# Patient Record
Sex: MALE | Race: WHITE | NOT HISPANIC OR LATINO | Employment: FULL TIME | URBAN - METROPOLITAN AREA
[De-identification: names, ages, dates, MRNs, and addresses within clinical notes are randomized per-mention and may not be internally consistent; named-entity substitution may affect disease eponyms.]

---

## 2017-06-07 ENCOUNTER — ALLSCRIPTS OFFICE VISIT (OUTPATIENT)
Dept: OTHER | Facility: OTHER | Age: 32
End: 2017-06-07

## 2018-01-09 NOTE — RESULT NOTES
Message   normal     Verified Results  (1) TESTOSTERONE, FREE (DIRECT) AND TOTAL [id number] 03:47PM Louise Riddle Order Number: [id number]     Test Name Result Flag Reference   FREE TESTOSTERONE, DIRECT 13 0 pg/mL  9 3 - 26 5   COMMENT Comment     Adult male reference interval is based on a population of lean males  up to 36years old     TESTOSTERONE (TOTAL) 491 ng/dL  348 - 1197   Performed at:  [address]  [id number]  : Tian Vizcaino MD, Phone:  [phone]

## 2018-01-10 NOTE — PROGRESS NOTES
Assessment    1  Influenza (487 1) (J11 1)   2  Body aches (780 96) (R52)    Plan  Body aches    · Rapid Flu A and B- POC; Source:Nose; Status:Complete;   Done: 64BIQ3445 01:35PM  Influenza    · Tamiflu 75 MG Oral Capsule; Take 1 twice daily    Discussion/Summary    Pt is a 30 yo M  1  Influenza - Rapid Flu today pos  Start supportive care  icnrease fluids and rest  May take OTC Dayquil/nyquil  Rx given for Tamiflu to take BID  Pt educated on the importance of having Flu vaccine  The patient was counseled regarding instructions for management, patient and family education, importance of compliance with treatment  Chief Complaint    1  Cold Symptoms   2  Cough  Pt presents with c/o cough, congestion, fever, body aches, and chills for the past 2 days  History of Present Illness  Cold Symptoms:   LORNA MASSEY presents with complaints of gradual onset of mild cold symptoms starting 2 days ago  His symptoms are probably caused by work contact with URI  Symptoms are not improved by OTC cold medications  Associated symptoms include nasal congestion, sore throat, productive cough, plugged ear(s), ear pain, shortness of breath, fever and chills, but no sneezing  Review of Systems    Constitutional: no fever, not feeling poorly, no chills and not feeling tired  ENT: no sore throat and no nasal discharge  Cardiovascular: no chest pain and no palpitations  Respiratory: no cough and no shortness of breath during exertion  Gastrointestinal: no abdominal pain, no nausea and no diarrhea  Genitourinary: no dysuria and no incontinence  Musculoskeletal: no arthralgias and no myalgias  Integumentary: no rashes  Active Problems    1  Binge eating disorder (307 50) (F50 8)   2  Depression (311) (F32 9)   3  Growth hormone deficiency (253 3) (E23 0)   4  Increased appetite (783 6) (R63 2)   5  Polyuria (788 42) (R35 8)   6  Testicular hypogonadism (257 2) (E29 1)    Past Medical History    1  History of Abnormal weight gain (783 1) (R63 5)   2  History of Acute upper respiratory infection (465 9) (J06 9)   3  Excessive thirst (783 5) (R63 1)   4  History of acute bronchitis (V12 69) (Z87 09)   5  History of acute sinusitis (V12 69) (Z87 09)   6  History of fatigue (V13 89) (Z87 898)   7  History of urinary frequency (V13 09) (Z87 898)   8  History of Need for immunization against influenza (V04 81) (Z23)  Active Problems And Past Medical History Reviewed: The active problems and past medical history were reviewed and updated today  Family History    1  Family history of cardiac disorder (V17 49) (Z82 49)   2  Family history of Hypertension (V17 49)    3  Family history of cardiac disorder (V17 49) (Z82 49)   4  Family history of Hypertension (V17 49)    5  Family history of Diabetes Mellitus (V18 0)  Family History Reviewed: The family history was reviewed and updated today  Social History    · Current Smoker (305 1)   · Denied: History of Drug Use   · Occasional alcohol use  The social history was reviewed and updated today  The social history was reviewed and is unchanged  Surgical History    1  History of Dental Surgery  Surgical History Reviewed: The surgical history was reviewed and updated today  Current Meds   1  Axiron 30 MG/ACT Transdermal Solution; Apply 3 pumps daily; Therapy: 41HRD4084 to ([place])  Requested for: 0490 39 07 81; Last   Rx:29Oct2015 Ordered   2  Vyvanse 40 MG Oral Capsule; TAKE 1 CAPSULE Daily; Therapy: 48SRS5290 to (Evaluate:52Hrw2609); Last Rx:07Jan2016 Ordered    The medication list was reviewed and updated today  Allergies    1   No Known Drug Allergies    Vitals   Recorded: 25LFX7557 01:28PM   Temperature 101 4 F, Tympanic   Heart Rate 86   Pulse Quality Norm   Systolic 651, LUE, Sitting   Diastolic 76, LUE, Sitting   Height 5 ft 10 in   Weight 197 lb    BMI Calculated 28 27   BSA Calculated 2 08   O2 Saturation 97     Physical Exam    Constitutional   General appearance: No acute distress, well appearing and well nourished  Eyes   Conjunctiva and lids: No swelling, erythema, or discharge  Pupils and irises: Equal, round and reactive to light  Ears, Nose, Mouth, and Throat   External inspection of ears and nose: Normal     Nasal mucosa, septum, and turbinates: Normal without edema or erythema  Oropharynx: Normal with no erythema, edema, exudate or lesions  Pulmonary   Respiratory effort: No increased work of breathing or signs of respiratory distress  Auscultation of lungs: Clear to auscultation, equal breath sounds bilaterally, no wheezes, no rales, no rhonci  Cardiovascular   Auscultation of heart: Normal rate and rhythm, normal S1 and S2, without murmurs  Examination of extremities for edema and/or varicosities: Normal     Abdomen   Abdomen: Non-tender, no masses  Liver and spleen: No hepatomegaly or splenomegaly  Results/Data  Rapid Flu A and B- POC 33KDD7002 01:35PM Lazarus Em     Test Name Result Flag Reference   Rapid Flu A Positive     Rapid Flu B Negative         Future Appointments    Date/Time Provider Specialty Site   11/03/2016 09:30 AM HUY Monzon  Endocrinology [facility]   02/26/2016 01:00 PM Leonor Hawk DO Family Medicine [address]     Signatures   Electronically signed by :  Yenni Cordova DO; Jan 22 2016  1:56PM EST                       (Author)

## 2018-01-12 NOTE — RESULT NOTES
Verified Results  [facility]) Pathology Report [id number] 07:27AM Ross Jean     Test Name Result Flag Reference     Comment     Material submitted:                                          SKIN BIOPSY, RIGHT INNER THIGH     Comment     Clinician provided ICD-10:  L98 9     Comment     Clinical history:                                            CHANGING SKIN LESION     Comment     **********************************************************************  Diagnosis:  SKIN BIOPSY, RIGHT INNER THIGH:  VERRUCA VULGARIS   AX/09/26/2016  **********************************************************************     Comment     Electronically signed:                                       Krishna Fitzgerald MD, Pathologist     Comment     Gross description:                                            1 Container, formalin-filled, labeled with patient identification  SKIN BIOPSY, RIGHT INNER THIGH:  Received labeled R INNER THIGH is 1 piece(s) of white skin measuring  0 7 x 0 7 x 0 1 cm  The specimen is inked  A white raised lesion  measuring 0 5 x 0 5 cm is present  The specimen(s) is serially  sectioned and entirely submitted in cassette(s) 1   /PRD  /PRD     Comment     Pathologist provided ICD-10:  B07 8     Comment     CPT                                                            [id number]

## 2018-01-13 VITALS
OXYGEN SATURATION: 97 % | SYSTOLIC BLOOD PRESSURE: 118 MMHG | BODY MASS INDEX: 26.92 KG/M2 | TEMPERATURE: 97.7 F | HEART RATE: 68 BPM | DIASTOLIC BLOOD PRESSURE: 70 MMHG | WEIGHT: 188 LBS | HEIGHT: 70 IN | RESPIRATION RATE: 16 BRPM

## 2018-01-16 NOTE — MISCELLANEOUS
Provider Comments  Provider Comments:   no shiw      Signatures   Electronically signed by : HUY Mir ; Nov  3 2016 11:38AM EST

## 2018-01-16 NOTE — MISCELLANEOUS
Message  Return to work or school:   Hever Meehan is under my professional care  He was seen in my office on 01/22/2016       OUT OF WORK ON 01/23/2016 AND 01/24/2016  DR Corey Bar DO        Signatures   Electronically signed by : Emperatriz Sandoval, ; Jan 22 2016  1:49PM EST                       (Author)

## 2018-02-17 ENCOUNTER — OFFICE VISIT (OUTPATIENT)
Dept: FAMILY MEDICINE CLINIC | Facility: CLINIC | Age: 33
End: 2018-02-17
Payer: COMMERCIAL

## 2018-02-17 VITALS
RESPIRATION RATE: 19 BRPM | WEIGHT: 192.19 LBS | BODY MASS INDEX: 27.52 KG/M2 | OXYGEN SATURATION: 97 % | TEMPERATURE: 97.6 F | HEIGHT: 70 IN | HEART RATE: 62 BPM | SYSTOLIC BLOOD PRESSURE: 126 MMHG | DIASTOLIC BLOOD PRESSURE: 88 MMHG

## 2018-02-17 DIAGNOSIS — E29.1 TESTICULAR HYPOGONADISM: Primary | ICD-10-CM

## 2018-02-17 DIAGNOSIS — E23.0 GROWTH HORMONE DEFICIENCY (HCC): ICD-10-CM

## 2018-02-17 PROCEDURE — 99213 OFFICE O/P EST LOW 20 MIN: CPT | Performed by: PHYSICIAN ASSISTANT

## 2018-02-17 RX ORDER — TESTOSTERONE 30 MG/1.5ML
SOLUTION TOPICAL
COMMUNITY
Start: 2013-11-26 | End: 2018-02-17 | Stop reason: SDUPTHER

## 2018-02-17 RX ORDER — TESTOSTERONE 30 MG/1.5ML
SOLUTION TOPICAL
Qty: 90 ML | Refills: 5 | Status: SHIPPED | OUTPATIENT
Start: 2018-02-17 | End: 2018-05-29 | Stop reason: SDUPTHER

## 2018-02-17 NOTE — PROGRESS NOTES
Assessment/Plan:    Growth hormone deficiency ([address] )  Was seeing endocrine - according to OV note from 2016 did not want tx  Stable  Testicular hypogonadism  Was seeing endo, now following with us for continued management  Testosterone refilled today  Also given script for FBW done - CBC, BMP, PSA, testosterone - will call him with results  Binge eating disorder  Stable, pt does not want to go back on vyvanse  Continue with therapist  Will call if problems  No purging  Diagnoses and all orders for this visit:    Testicular hypogonadism  -     Testosterone 30 MG/ACT SOLN; Apply 3 pumps to skin/axilla as directed daily  -     CBC and differential; Future  -     Basic metabolic panel; Future  -     Testosterone, free, total; Future  -     PSA, total and free; Future    Growth hormone deficiency (HCC)  -     Testosterone 30 MG/ACT SOLN; Apply 3 pumps to skin/axilla as directed daily  -     CBC and differential; Future  -     Basic metabolic panel; Future  -     Testosterone, free, total; Future  -     PSA, total and free; Future      F/U in 6 months  Chief Complaint   Patient presents with    Medication Check     pt states he stopped taking Vyvanse since 7/2017 due to insurance change     Subjective:      Patient ID: Allen Ramos is a 29 y o  male  31y/o male here today for med check for his testosterone  Was seeing endocrine for testicular hypogonadism and growth hormone deficiency  Was being tx with testosterone topical and has been doing well  Feels well overall and has no complaints  He still has binge eating problems at times, no purging  Controlled by going to gym, seeing a therapist  Yudith conklin/hugo wasn't helping anymore  Doesn't want to try new medication          The following portions of the patient's history were reviewed and updated as appropriate: allergies, current medications, past family history, past medical history, past social history, past surgical history and problem list     Review of Systems   Constitutional: Negative  Respiratory: Negative  Cardiovascular: Negative  Gastrointestinal: Negative  Endocrine: Negative  Genitourinary: Negative  Psychiatric/Behavioral:        As in HPI         Objective:      /88 (BP Location: Left arm, Patient Position: Sitting, Cuff Size: Adult)   Pulse 62   Temp 97 6 °F (36 4 °C) (Tympanic)   Resp 19   Ht 5' 10" (1 778 m)   Wt 87 2 kg (192 lb 3 oz)   SpO2 97%   BMI 27 58 kg/m²          Physical Exam   Constitutional: He is oriented to person, place, and time  He appears well-developed and well-nourished  Neck: Neck supple  Cardiovascular: Normal rate, regular rhythm and normal heart sounds  Pulmonary/Chest: Effort normal and breath sounds normal    Abdominal: Soft  Bowel sounds are normal    Neurological: He is alert and oriented to person, place, and time  Psychiatric: He has a normal mood and affect       Rosevelt Elmer

## 2018-02-17 NOTE — ASSESSMENT & PLAN NOTE
Was seeing philip, now following with us for continued management  Testosterone refilled today  Also given script for FBW done - CBC, BMP, PSA, testosterone - will call him with results

## 2018-02-17 NOTE — ASSESSMENT & PLAN NOTE
Stable, pt does not want to go back on vyvanse  Continue with therapist  Will call if problems  No purging

## 2018-05-29 DIAGNOSIS — E29.1 TESTICULAR HYPOGONADISM: ICD-10-CM

## 2018-05-29 DIAGNOSIS — E23.0 GROWTH HORMONE DEFICIENCY (HCC): ICD-10-CM

## 2018-05-29 RX ORDER — TESTOSTERONE 30 MG/1.5ML
SOLUTION TOPICAL
Qty: 90 ML | Refills: 1 | Status: SHIPPED | OUTPATIENT
Start: 2018-05-29 | End: 2018-09-26 | Stop reason: SDUPTHER

## 2018-09-26 DIAGNOSIS — E29.1 TESTICULAR HYPOGONADISM: ICD-10-CM

## 2018-09-26 DIAGNOSIS — E23.0 GROWTH HORMONE DEFICIENCY (HCC): ICD-10-CM

## 2018-09-26 RX ORDER — TESTOSTERONE 30 MG/1.5ML
SOLUTION TOPICAL
Qty: 90 ML | Refills: 1 | Status: SHIPPED | OUTPATIENT
Start: 2018-09-26 | End: 2019-01-03 | Stop reason: SDUPTHER

## 2019-01-03 DIAGNOSIS — E29.1 TESTICULAR HYPOGONADISM: ICD-10-CM

## 2019-01-03 DIAGNOSIS — E23.0 GROWTH HORMONE DEFICIENCY (HCC): ICD-10-CM

## 2019-01-06 RX ORDER — TESTOSTERONE 30 MG/1.5ML
SOLUTION TOPICAL
Qty: 90 ML | Refills: 1 | Status: SHIPPED | OUTPATIENT
Start: 2019-01-06 | End: 2019-03-12 | Stop reason: SDUPTHER

## 2019-03-12 DIAGNOSIS — E29.1 TESTICULAR HYPOGONADISM: ICD-10-CM

## 2019-03-12 DIAGNOSIS — E23.0 GROWTH HORMONE DEFICIENCY (HCC): ICD-10-CM

## 2019-03-12 RX ORDER — TESTOSTERONE 30 MG/1.5ML
SOLUTION TOPICAL
Qty: 90 ML | Refills: 1 | Status: SHIPPED | OUTPATIENT
Start: 2019-03-12 | End: 2019-05-15 | Stop reason: SDUPTHER

## 2019-05-15 DIAGNOSIS — E23.0 GROWTH HORMONE DEFICIENCY (HCC): ICD-10-CM

## 2019-05-15 DIAGNOSIS — E29.1 TESTICULAR HYPOGONADISM: ICD-10-CM

## 2019-05-15 RX ORDER — TESTOSTERONE 30 MG/1.5ML
30 SOLUTION TOPICAL 3 TIMES DAILY
Qty: 90 ML | Refills: 1 | Status: SHIPPED | OUTPATIENT
Start: 2019-05-15 | End: 2019-07-12 | Stop reason: SDUPTHER

## 2019-07-12 DIAGNOSIS — E29.1 TESTICULAR HYPOGONADISM: ICD-10-CM

## 2019-07-12 DIAGNOSIS — E23.0 GROWTH HORMONE DEFICIENCY (HCC): ICD-10-CM

## 2019-07-14 RX ORDER — TESTOSTERONE 30 MG/1.5ML
SOLUTION TOPICAL
Qty: 90 ML | Refills: 1 | Status: SHIPPED | OUTPATIENT
Start: 2019-07-14 | End: 2019-10-01 | Stop reason: SDUPTHER

## 2019-10-01 DIAGNOSIS — E23.0 GROWTH HORMONE DEFICIENCY (HCC): ICD-10-CM

## 2019-10-01 DIAGNOSIS — E29.1 TESTICULAR HYPOGONADISM: ICD-10-CM

## 2019-10-01 RX ORDER — TESTOSTERONE 30 MG/1.5ML
SOLUTION TOPICAL
Qty: 90 ML | Refills: 1 | Status: SHIPPED | OUTPATIENT
Start: 2019-10-01 | End: 2019-10-12 | Stop reason: SDUPTHER

## 2019-10-12 ENCOUNTER — OFFICE VISIT (OUTPATIENT)
Dept: FAMILY MEDICINE CLINIC | Facility: CLINIC | Age: 34
End: 2019-10-12
Payer: COMMERCIAL

## 2019-10-12 VITALS
HEART RATE: 55 BPM | SYSTOLIC BLOOD PRESSURE: 98 MMHG | TEMPERATURE: 96.7 F | RESPIRATION RATE: 16 BRPM | OXYGEN SATURATION: 97 % | DIASTOLIC BLOOD PRESSURE: 70 MMHG

## 2019-10-12 DIAGNOSIS — E23.0 GROWTH HORMONE DEFICIENCY (HCC): ICD-10-CM

## 2019-10-12 DIAGNOSIS — E29.1 TESTICULAR HYPOGONADISM: ICD-10-CM

## 2019-10-12 DIAGNOSIS — F50.81 BINGE EATING DISORDER: ICD-10-CM

## 2019-10-12 DIAGNOSIS — Z13.29 SCREENING FOR THYROID DISORDER: ICD-10-CM

## 2019-10-12 DIAGNOSIS — Z13.0 SCREENING FOR DEFICIENCY ANEMIA: Primary | ICD-10-CM

## 2019-10-12 PROCEDURE — 99213 OFFICE O/P EST LOW 20 MIN: CPT | Performed by: FAMILY MEDICINE

## 2019-10-12 RX ORDER — TESTOSTERONE 30 MG/1.5ML
SOLUTION TOPICAL
Qty: 90 ML | Refills: 1 | Status: SHIPPED | OUTPATIENT
Start: 2019-10-12 | End: 2019-11-12 | Stop reason: SDUPTHER

## 2019-10-12 NOTE — PROGRESS NOTES
[facility]      NAME: Kenny Cornelius Younger  AGE: 30 y o  SEX: male  : 1985   MRN: [id number]    DATE: 10/12/2019  TIME: 10:17 AM    Assessment and Plan     Problem List Items Addressed This Visit     Testicular hypogonadism      Doing well on testosterone, 3 pumps daily  He is long overdue for blood work  Patient will restart med and have labs rechecked in a few weeks  Will call with results  Relevant Medications    Testosterone 30 MG/ACT SOLN    Other Relevant Orders    Comprehensive metabolic panel    Lipid Panel with Direct LDL reflex    Testosterone, free, total    Binge eating disorder      Patient did not notice any improvement on Vyvanse  Med was discontinued  He is doing well without problems         RESOLVED: Growth hormone deficiency ([facility] [address] )    Relevant Medications    Testosterone 30 MG/ACT SOLN      Other Visit Diagnoses     Screening for deficiency anemia    -  Primary    Relevant Orders    CBC and differential    Screening for thyroid disorder        Relevant Orders    TSH, 3rd generation with Free T4 reflex              Return to office in:   Rx given for fasting blood work (Jarad Avalos)  Will call with results     patient declines flu shot   recheck 6 months    Chief Complaint     Chief Complaint   Patient presents with    Follow-up     med check       History of Present Illness      Patient presents for recheck of chronic medical problems  He is doing well on testosterone pump, 3 pumps daily, unfortunately he ran out of medication recently  The following portions of the patient's history were reviewed and updated as appropriate: allergies, current medications, past family history, past medical history, past social history, past surgical history and problem list     Review of Systems   Review of Systems   Respiratory: Negative  Cardiovascular: Negative  Gastrointestinal: Negative  Genitourinary: Negative          Active Problem List     Patient Active Problem List   Diagnosis    Depression    Testicular hypogonadism    Binge eating disorder       Objective   BP 98/70 (BP Location: Left arm, Patient Position: Sitting)   Pulse 55   Temp (!) 96 7 °F (35 9 °C) (Tympanic)   Resp 16   SpO2 97%     Physical Exam   Cardiovascular: Normal rate, regular rhythm, normal heart sounds and intact distal pulses  Carotids: no bruits  Ext: no edema   Pulmonary/Chest: Effort normal  No respiratory distress  He has no wheezes  He has no rales  Psychiatric: He has a normal mood and affect  His behavior is normal  Thought content normal        Pertinent Laboratory/Diagnostic Studies:    None    Current Medications     Current Outpatient Medications:     Testosterone 30 MG/ACT SOLN, 3 pumps daily, Disp: 90 mL, Rfl: 1    lisdexamfetamine (VYVANSE) 40 MG capsule, Take 1 capsule by mouth daily, Disp: , Rfl:     Health Maintenance     Health Maintenance   Topic Date Due    BMI: Adult  03/03/2005    DTaP,Tdap,and Td Vaccines (1 - Tdap) 03/03/2008    INFLUENZA VACCINE  10/01/2020 (Originally 7/1/2019)    Pneumococcal Vaccine: 65+ Years (1 of 2 - PCV13) 03/03/2052    Pneumococcal Vaccine: Pediatrics (0 to 5 Years) and At-Risk Patients (6 to 59 Years)  Aged Out    HEPATITIS B VACCINES  Aged Out     There is no immunization history for the selected administration types on file for this patient      DO Joesph Lowe [facility]

## 2019-10-12 NOTE — ASSESSMENT & PLAN NOTE
Patient did not notice any improvement on Vyvanse  Med was discontinued    He is doing well without problems

## 2019-10-12 NOTE — ASSESSMENT & PLAN NOTE
Doing well on testosterone, 3 pumps daily  He is long overdue for blood work  Patient will restart med and have labs rechecked in a few weeks  Will call with results

## 2019-11-12 DIAGNOSIS — E29.1 TESTICULAR HYPOGONADISM: ICD-10-CM

## 2019-11-12 DIAGNOSIS — E23.0 GROWTH HORMONE DEFICIENCY (HCC): ICD-10-CM

## 2019-11-12 RX ORDER — TESTOSTERONE 30 MG/1.5ML
SOLUTION TOPICAL
Qty: 90 ML | Refills: 1 | Status: SHIPPED | OUTPATIENT
Start: 2019-11-12 | End: 2020-04-06

## 2019-12-05 LAB
ALBUMIN SERPL-MCNC: 4.9 G/DL (ref 3.5–5.5)
ALBUMIN/GLOB SERPL: 2.2 {RATIO} (ref 1.2–2.2)
ALP SERPL-CCNC: 47 IU/L (ref 39–117)
ALT SERPL-CCNC: 15 IU/L (ref 0–44)
AST SERPL-CCNC: 29 IU/L (ref 0–40)
BASOPHILS # BLD AUTO: 0 X10E3/UL (ref 0–0.2)
BASOPHILS NFR BLD AUTO: 0 %
BILIRUB SERPL-MCNC: 0.7 MG/DL (ref 0–1.2)
BUN SERPL-MCNC: 14 MG/DL (ref 6–20)
BUN/CREAT SERPL: 17 (ref 9–20)
CALCIUM SERPL-MCNC: 9.9 MG/DL (ref 8.7–10.2)
CHLORIDE SERPL-SCNC: 98 MMOL/L (ref 96–106)
CHOLEST SERPL-MCNC: 185 MG/DL (ref 100–199)
CO2 SERPL-SCNC: 23 MMOL/L (ref 20–29)
CREAT SERPL-MCNC: 0.81 MG/DL (ref 0.76–1.27)
EOSINOPHIL # BLD AUTO: 0.2 X10E3/UL (ref 0–0.4)
EOSINOPHIL NFR BLD AUTO: 3 %
ERYTHROCYTE [DISTWIDTH] IN BLOOD BY AUTOMATED COUNT: 14.3 % (ref 12.3–15.4)
GLOBULIN SER-MCNC: 2.2 G/DL (ref 1.5–4.5)
GLUCOSE SERPL-MCNC: 87 MG/DL (ref 65–99)
HCT VFR BLD AUTO: 46.6 % (ref 37.5–51)
HDLC SERPL-MCNC: 64 MG/DL
HGB BLD-MCNC: 15.5 G/DL (ref 13–17.7)
IMM GRANULOCYTES # BLD: 0 X10E3/UL (ref 0–0.1)
IMM GRANULOCYTES NFR BLD: 0 %
LDLC SERPL CALC-MCNC: 111 MG/DL (ref 0–99)
LDLC/HDLC SERPL: 1.7 RATIO (ref 0–3.6)
LYMPHOCYTES # BLD AUTO: 1.6 X10E3/UL (ref 0.7–3.1)
LYMPHOCYTES NFR BLD AUTO: 35 %
MCH RBC QN AUTO: 29.6 PG (ref 26.6–33)
MCHC RBC AUTO-ENTMCNC: 33.3 G/DL (ref 31.5–35.7)
MCV RBC AUTO: 89 FL (ref 79–97)
MONOCYTES # BLD AUTO: 0.5 X10E3/UL (ref 0.1–0.9)
MONOCYTES NFR BLD AUTO: 11 %
NEUTROPHILS # BLD AUTO: 2.4 X10E3/UL (ref 1.4–7)
NEUTROPHILS NFR BLD AUTO: 51 %
PLATELET # BLD AUTO: 248 X10E3/UL (ref 150–450)
POTASSIUM SERPL-SCNC: 4.6 MMOL/L (ref 3.5–5.2)
PROT SERPL-MCNC: 7.1 G/DL (ref 6–8.5)
RBC # BLD AUTO: 5.23 X10E6/UL (ref 4.14–5.8)
SL AMB EGFR AFRICAN AMERICAN: 136 ML/MIN/1.73
SL AMB EGFR NON AFRICAN AMERICAN: 118 ML/MIN/1.73
SL AMB VLDL CHOLESTEROL CALC: 10 MG/DL (ref 5–40)
SODIUM SERPL-SCNC: 136 MMOL/L (ref 134–144)
TESTOST FREE SERPL-MCNC: 16.5 PG/ML (ref 8.7–25.1)
TESTOST SERPL-MCNC: 698 NG/DL (ref 264–916)
TRIGL SERPL-MCNC: 52 MG/DL (ref 0–149)
TSH SERPL DL<=0.005 MIU/L-ACNC: 2.8 UIU/ML (ref 0.45–4.5)
WBC # BLD AUTO: 4.7 X10E3/UL (ref 3.4–10.8)

## 2020-04-06 DIAGNOSIS — E29.1 TESTICULAR HYPOGONADISM: ICD-10-CM

## 2020-04-06 DIAGNOSIS — E23.0 GROWTH HORMONE DEFICIENCY (HCC): ICD-10-CM

## 2020-04-06 RX ORDER — TESTOSTERONE 30 MG/1.5ML
SOLUTION TOPICAL
Qty: 90 ML | Refills: 1 | Status: SHIPPED | OUTPATIENT
Start: 2020-04-06 | End: 2020-06-23

## 2020-06-22 ENCOUNTER — TELEPHONE (OUTPATIENT)
Dept: FAMILY MEDICINE CLINIC | Facility: CLINIC | Age: 35
End: 2020-06-22

## 2020-06-23 DIAGNOSIS — E23.0 GROWTH HORMONE DEFICIENCY (HCC): ICD-10-CM

## 2020-06-23 DIAGNOSIS — E29.1 TESTICULAR HYPOGONADISM: ICD-10-CM

## 2020-06-23 RX ORDER — TESTOSTERONE 30 MG/1.5ML
SOLUTION TOPICAL
Qty: 90 ML | Refills: 1 | Status: SHIPPED | OUTPATIENT
Start: 2020-06-23 | End: 2020-09-16

## 2020-09-16 DIAGNOSIS — E23.0 GROWTH HORMONE DEFICIENCY (HCC): ICD-10-CM

## 2020-09-16 DIAGNOSIS — E29.1 TESTICULAR HYPOGONADISM: ICD-10-CM

## 2020-09-16 RX ORDER — TESTOSTERONE 30 MG/1.5ML
SOLUTION TOPICAL
Qty: 90 ML | Refills: 1 | Status: SHIPPED | OUTPATIENT
Start: 2020-09-16 | End: 2020-11-16

## 2020-11-16 DIAGNOSIS — E23.0 GROWTH HORMONE DEFICIENCY (HCC): ICD-10-CM

## 2020-11-16 DIAGNOSIS — E29.1 TESTICULAR HYPOGONADISM: ICD-10-CM

## 2020-11-16 RX ORDER — TESTOSTERONE 30 MG/1.5ML
SOLUTION TOPICAL
Qty: 90 ML | Refills: 1 | Status: SHIPPED | OUTPATIENT
Start: 2020-11-16 | End: 2021-01-10

## 2021-01-10 DIAGNOSIS — E23.0 GROWTH HORMONE DEFICIENCY (HCC): ICD-10-CM

## 2021-01-10 DIAGNOSIS — E29.1 TESTICULAR HYPOGONADISM: ICD-10-CM

## 2021-01-10 RX ORDER — TESTOSTERONE 30 MG/1.5ML
SOLUTION TOPICAL
Qty: 90 ML | Refills: 1 | Status: SHIPPED | OUTPATIENT
Start: 2021-01-10 | End: 2021-04-07

## 2021-04-07 DIAGNOSIS — E29.1 TESTICULAR HYPOGONADISM: ICD-10-CM

## 2021-04-07 DIAGNOSIS — E23.0 GROWTH HORMONE DEFICIENCY (HCC): ICD-10-CM

## 2021-04-07 RX ORDER — TESTOSTERONE 30 MG/1.5ML
SOLUTION TOPICAL
Qty: 90 ML | Refills: 1 | Status: SHIPPED | OUTPATIENT
Start: 2021-04-07 | End: 2021-06-10

## 2021-06-10 DIAGNOSIS — E29.1 TESTICULAR HYPOGONADISM: ICD-10-CM

## 2021-06-10 DIAGNOSIS — E23.0 GROWTH HORMONE DEFICIENCY (HCC): ICD-10-CM

## 2021-06-10 RX ORDER — TESTOSTERONE 30 MG/1.5ML
SOLUTION TOPICAL
Qty: 90 ML | Refills: 1 | Status: SHIPPED | OUTPATIENT
Start: 2021-06-10